# Patient Record
Sex: FEMALE | Race: BLACK OR AFRICAN AMERICAN | NOT HISPANIC OR LATINO | URBAN - METROPOLITAN AREA
[De-identification: names, ages, dates, MRNs, and addresses within clinical notes are randomized per-mention and may not be internally consistent; named-entity substitution may affect disease eponyms.]

---

## 2023-11-02 PROBLEM — Z00.00 ENCOUNTER FOR PREVENTIVE HEALTH EXAMINATION: Status: ACTIVE | Noted: 2023-11-02

## 2023-11-20 ENCOUNTER — EMERGENCY (EMERGENCY)
Facility: HOSPITAL | Age: 38
LOS: 1 days | Discharge: HOME | End: 2023-11-20
Attending: STUDENT IN AN ORGANIZED HEALTH CARE EDUCATION/TRAINING PROGRAM | Admitting: STUDENT IN AN ORGANIZED HEALTH CARE EDUCATION/TRAINING PROGRAM
Payer: COMMERCIAL

## 2023-11-20 ENCOUNTER — APPOINTMENT (OUTPATIENT)
Dept: HEPATOLOGY | Facility: CLINIC | Age: 38
End: 2023-11-20
Payer: MEDICAID

## 2023-11-20 VITALS
HEART RATE: 69 BPM | SYSTOLIC BLOOD PRESSURE: 110 MMHG | OXYGEN SATURATION: 100 % | TEMPERATURE: 98 F | RESPIRATION RATE: 18 BRPM | WEIGHT: 139.99 LBS | HEIGHT: 62 IN | DIASTOLIC BLOOD PRESSURE: 57 MMHG

## 2023-11-20 VITALS — DIASTOLIC BLOOD PRESSURE: 76 MMHG | HEART RATE: 71 BPM | SYSTOLIC BLOOD PRESSURE: 123 MMHG

## 2023-11-20 VITALS
WEIGHT: 140 LBS | HEART RATE: 86 BPM | TEMPERATURE: 98.9 F | DIASTOLIC BLOOD PRESSURE: 65 MMHG | HEIGHT: 62 IN | BODY MASS INDEX: 25.76 KG/M2 | OXYGEN SATURATION: 100 % | RESPIRATION RATE: 16 BRPM | SYSTOLIC BLOOD PRESSURE: 111 MMHG

## 2023-11-20 DIAGNOSIS — R55 SYNCOPE AND COLLAPSE: ICD-10-CM

## 2023-11-20 DIAGNOSIS — Z87.19 PERSONAL HISTORY OF OTHER DISEASES OF THE DIGESTIVE SYSTEM: ICD-10-CM

## 2023-11-20 DIAGNOSIS — D64.9 ANEMIA, UNSPECIFIED: ICD-10-CM

## 2023-11-20 DIAGNOSIS — R17 UNSPECIFIED JAUNDICE: ICD-10-CM

## 2023-11-20 LAB
ALBUMIN SERPL ELPH-MCNC: 2.9 G/DL — LOW (ref 3.3–5)
ALBUMIN SERPL ELPH-MCNC: 2.9 G/DL — LOW (ref 3.3–5)
ALP SERPL-CCNC: 154 U/L — HIGH (ref 40–120)
ALP SERPL-CCNC: 154 U/L — HIGH (ref 40–120)
ALT FLD-CCNC: 31 U/L — SIGNIFICANT CHANGE UP (ref 10–45)
ALT FLD-CCNC: 31 U/L — SIGNIFICANT CHANGE UP (ref 10–45)
ANION GAP SERPL CALC-SCNC: 8 MMOL/L — SIGNIFICANT CHANGE UP (ref 5–17)
ANION GAP SERPL CALC-SCNC: 8 MMOL/L — SIGNIFICANT CHANGE UP (ref 5–17)
ANISOCYTOSIS BLD QL: SLIGHT — SIGNIFICANT CHANGE UP
ANISOCYTOSIS BLD QL: SLIGHT — SIGNIFICANT CHANGE UP
AST SERPL-CCNC: 80 U/L — HIGH (ref 10–40)
AST SERPL-CCNC: 80 U/L — HIGH (ref 10–40)
BASOPHILS # BLD AUTO: 0 K/UL — SIGNIFICANT CHANGE UP (ref 0–0.2)
BASOPHILS # BLD AUTO: 0 K/UL — SIGNIFICANT CHANGE UP (ref 0–0.2)
BASOPHILS NFR BLD AUTO: 0 % — SIGNIFICANT CHANGE UP (ref 0–2)
BASOPHILS NFR BLD AUTO: 0 % — SIGNIFICANT CHANGE UP (ref 0–2)
BILIRUB SERPL-MCNC: 5.6 MG/DL — HIGH (ref 0.2–1.2)
BILIRUB SERPL-MCNC: 5.6 MG/DL — HIGH (ref 0.2–1.2)
BUN SERPL-MCNC: 9 MG/DL — SIGNIFICANT CHANGE UP (ref 7–23)
BUN SERPL-MCNC: 9 MG/DL — SIGNIFICANT CHANGE UP (ref 7–23)
CALCIUM SERPL-MCNC: 9.5 MG/DL — SIGNIFICANT CHANGE UP (ref 8.4–10.5)
CALCIUM SERPL-MCNC: 9.5 MG/DL — SIGNIFICANT CHANGE UP (ref 8.4–10.5)
CHLORIDE SERPL-SCNC: 103 MMOL/L — SIGNIFICANT CHANGE UP (ref 96–108)
CHLORIDE SERPL-SCNC: 103 MMOL/L — SIGNIFICANT CHANGE UP (ref 96–108)
CO2 SERPL-SCNC: 24 MMOL/L — SIGNIFICANT CHANGE UP (ref 22–31)
CO2 SERPL-SCNC: 24 MMOL/L — SIGNIFICANT CHANGE UP (ref 22–31)
CREAT SERPL-MCNC: 0.78 MG/DL — SIGNIFICANT CHANGE UP (ref 0.5–1.3)
CREAT SERPL-MCNC: 0.78 MG/DL — SIGNIFICANT CHANGE UP (ref 0.5–1.3)
EGFR: 101 ML/MIN/1.73M2 — SIGNIFICANT CHANGE UP
EGFR: 101 ML/MIN/1.73M2 — SIGNIFICANT CHANGE UP
EOSINOPHIL # BLD AUTO: 0.26 K/UL — SIGNIFICANT CHANGE UP (ref 0–0.5)
EOSINOPHIL # BLD AUTO: 0.26 K/UL — SIGNIFICANT CHANGE UP (ref 0–0.5)
EOSINOPHIL NFR BLD AUTO: 3.5 % — SIGNIFICANT CHANGE UP (ref 0–6)
EOSINOPHIL NFR BLD AUTO: 3.5 % — SIGNIFICANT CHANGE UP (ref 0–6)
GLUCOSE SERPL-MCNC: 106 MG/DL — HIGH (ref 70–99)
GLUCOSE SERPL-MCNC: 106 MG/DL — HIGH (ref 70–99)
HCG SERPL-ACNC: <0 MIU/ML — SIGNIFICANT CHANGE UP
HCG SERPL-ACNC: <0 MIU/ML — SIGNIFICANT CHANGE UP
HCT VFR BLD CALC: 29.7 % — LOW (ref 34.5–45)
HCT VFR BLD CALC: 29.7 % — LOW (ref 34.5–45)
HGB BLD-MCNC: 9.8 G/DL — LOW (ref 11.5–15.5)
HGB BLD-MCNC: 9.8 G/DL — LOW (ref 11.5–15.5)
HYPOCHROMIA BLD QL: SIGNIFICANT CHANGE UP
HYPOCHROMIA BLD QL: SIGNIFICANT CHANGE UP
LYMPHOCYTES # BLD AUTO: 0.77 K/UL — LOW (ref 1–3.3)
LYMPHOCYTES # BLD AUTO: 0.77 K/UL — LOW (ref 1–3.3)
LYMPHOCYTES # BLD AUTO: 10.4 % — LOW (ref 13–44)
LYMPHOCYTES # BLD AUTO: 10.4 % — LOW (ref 13–44)
MACROCYTES BLD QL: SLIGHT — SIGNIFICANT CHANGE UP
MACROCYTES BLD QL: SLIGHT — SIGNIFICANT CHANGE UP
MANUAL SMEAR VERIFICATION: SIGNIFICANT CHANGE UP
MANUAL SMEAR VERIFICATION: SIGNIFICANT CHANGE UP
MCHC RBC-ENTMCNC: 31.1 PG — SIGNIFICANT CHANGE UP (ref 27–34)
MCHC RBC-ENTMCNC: 31.1 PG — SIGNIFICANT CHANGE UP (ref 27–34)
MCHC RBC-ENTMCNC: 33 GM/DL — SIGNIFICANT CHANGE UP (ref 32–36)
MCHC RBC-ENTMCNC: 33 GM/DL — SIGNIFICANT CHANGE UP (ref 32–36)
MCV RBC AUTO: 94.3 FL — SIGNIFICANT CHANGE UP (ref 80–100)
MCV RBC AUTO: 94.3 FL — SIGNIFICANT CHANGE UP (ref 80–100)
MONOCYTES # BLD AUTO: 0.38 K/UL — SIGNIFICANT CHANGE UP (ref 0–0.9)
MONOCYTES # BLD AUTO: 0.38 K/UL — SIGNIFICANT CHANGE UP (ref 0–0.9)
MONOCYTES NFR BLD AUTO: 5.2 % — SIGNIFICANT CHANGE UP (ref 2–14)
MONOCYTES NFR BLD AUTO: 5.2 % — SIGNIFICANT CHANGE UP (ref 2–14)
MYELOCYTES NFR BLD: 1.8 % — HIGH (ref 0–0)
MYELOCYTES NFR BLD: 1.8 % — HIGH (ref 0–0)
NEUTROPHILS # BLD AUTO: 5.84 K/UL — SIGNIFICANT CHANGE UP (ref 1.8–7.4)
NEUTROPHILS # BLD AUTO: 5.84 K/UL — SIGNIFICANT CHANGE UP (ref 1.8–7.4)
NEUTROPHILS NFR BLD AUTO: 79.1 % — HIGH (ref 43–77)
NEUTROPHILS NFR BLD AUTO: 79.1 % — HIGH (ref 43–77)
OVALOCYTES BLD QL SMEAR: SLIGHT — SIGNIFICANT CHANGE UP
OVALOCYTES BLD QL SMEAR: SLIGHT — SIGNIFICANT CHANGE UP
PLAT MORPH BLD: NORMAL — SIGNIFICANT CHANGE UP
PLAT MORPH BLD: NORMAL — SIGNIFICANT CHANGE UP
PLATELET # BLD AUTO: 104 K/UL — LOW (ref 150–400)
PLATELET # BLD AUTO: 104 K/UL — LOW (ref 150–400)
POIKILOCYTOSIS BLD QL AUTO: SIGNIFICANT CHANGE UP
POIKILOCYTOSIS BLD QL AUTO: SIGNIFICANT CHANGE UP
POLYCHROMASIA BLD QL SMEAR: SLIGHT — SIGNIFICANT CHANGE UP
POLYCHROMASIA BLD QL SMEAR: SLIGHT — SIGNIFICANT CHANGE UP
POTASSIUM SERPL-MCNC: 4.1 MMOL/L — SIGNIFICANT CHANGE UP (ref 3.5–5.3)
POTASSIUM SERPL-MCNC: 4.1 MMOL/L — SIGNIFICANT CHANGE UP (ref 3.5–5.3)
POTASSIUM SERPL-SCNC: 4.1 MMOL/L — SIGNIFICANT CHANGE UP (ref 3.5–5.3)
POTASSIUM SERPL-SCNC: 4.1 MMOL/L — SIGNIFICANT CHANGE UP (ref 3.5–5.3)
PROT SERPL-MCNC: 8.3 G/DL — SIGNIFICANT CHANGE UP (ref 6–8.3)
PROT SERPL-MCNC: 8.3 G/DL — SIGNIFICANT CHANGE UP (ref 6–8.3)
RBC # BLD: 3.15 M/UL — LOW (ref 3.8–5.2)
RBC # BLD: 3.15 M/UL — LOW (ref 3.8–5.2)
RBC # FLD: 17.7 % — HIGH (ref 10.3–14.5)
RBC # FLD: 17.7 % — HIGH (ref 10.3–14.5)
RBC BLD AUTO: ABNORMAL
RBC BLD AUTO: ABNORMAL
SODIUM SERPL-SCNC: 135 MMOL/L — SIGNIFICANT CHANGE UP (ref 135–145)
SODIUM SERPL-SCNC: 135 MMOL/L — SIGNIFICANT CHANGE UP (ref 135–145)
TARGETS BLD QL SMEAR: SIGNIFICANT CHANGE UP
TARGETS BLD QL SMEAR: SIGNIFICANT CHANGE UP
WBC # BLD: 7.38 K/UL — SIGNIFICANT CHANGE UP (ref 3.8–10.5)
WBC # BLD: 7.38 K/UL — SIGNIFICANT CHANGE UP (ref 3.8–10.5)
WBC # FLD AUTO: 7.38 K/UL — SIGNIFICANT CHANGE UP (ref 3.8–10.5)
WBC # FLD AUTO: 7.38 K/UL — SIGNIFICANT CHANGE UP (ref 3.8–10.5)

## 2023-11-20 PROCEDURE — 84702 CHORIONIC GONADOTROPIN TEST: CPT

## 2023-11-20 PROCEDURE — 36415 COLL VENOUS BLD VENIPUNCTURE: CPT

## 2023-11-20 PROCEDURE — 99283 EMERGENCY DEPT VISIT LOW MDM: CPT

## 2023-11-20 PROCEDURE — 82962 GLUCOSE BLOOD TEST: CPT

## 2023-11-20 PROCEDURE — 99284 EMERGENCY DEPT VISIT MOD MDM: CPT

## 2023-11-20 PROCEDURE — 80053 COMPREHEN METABOLIC PANEL: CPT

## 2023-11-20 PROCEDURE — 99205 OFFICE O/P NEW HI 60 MIN: CPT

## 2023-11-20 PROCEDURE — 85025 COMPLETE CBC W/AUTO DIFF WBC: CPT

## 2023-11-20 RX ORDER — SODIUM CHLORIDE 9 MG/ML
1000 INJECTION INTRAMUSCULAR; INTRAVENOUS; SUBCUTANEOUS ONCE
Refills: 0 | Status: COMPLETED | OUTPATIENT
Start: 2023-11-20 | End: 2023-11-20

## 2023-11-20 RX ADMIN — SODIUM CHLORIDE 1000 MILLILITER(S): 9 INJECTION INTRAMUSCULAR; INTRAVENOUS; SUBCUTANEOUS at 14:13

## 2023-11-20 NOTE — ED PROVIDER NOTE - PHYSICAL EXAMINATION
CONSTITUTIONAL: Well-appearing;  in no apparent distress.   HEAD: Normocephalic; atraumatic.   EYES: PERRL; EOM intact; conjunctiva, +scleral icterus   ENT: normal nose; no rhinorrhea; normal pharynx with no erythema or lesions.   NECK: Supple; non-tender; no LAD  CARDIOVASCULAR: Normal S1, S2; No audible murmurs. Regular rate and rhythm.   RESPIRATORY: Breathing easily; breath sounds clear and equal bilaterally; no wheezes, rhonchi, or rales.  GI: Soft; distended; non-tender; no palpable organomegaly.   MSK: FROM at all extremities, normal tone   EXT: No cyanosis or edema; N/V intact  SKIN: Normal for age and race; warm; dry; good turgor; no apparent lesions or rash.   NEURO: A & O x 3; face symmetric; grossly unremarkable.   PSYCHOLOGICAL: The patient’s mood and manner are appropriate.

## 2023-11-20 NOTE — ED PROVIDER NOTE - ATTENDING APP SHARED VISIT CONTRIBUTION OF CARE
39 yo f with pmh of cirrhosis, etoh abuse (quit 3 week ago), jaundice c/o near syncopal episode while at the doctors office prior to arrival. will check labs, ekg, hcg, reassess.

## 2023-11-20 NOTE — ED PROVIDER NOTE - OBJECTIVE STATEMENT
37 yo f with pmh of cirrhosis, etoh abuse (quit 3 week ago), jaundice c/o near syncopal episode while at the doctors office prior to arrival. Pt was sitting felt lightheaded, her heart was racing and she was sweaty. Pt felt like she was going to pass out.  [place] worse when she tried to stand up. Pt states she feels well now. Denies ha, dizziness, n/v, cp, sob, abd pain. Pt seen at outside hospital in oct for ascites and had a therapeutic paracentesis. Pt states her abd has been getting smaller. Pt had blood work done with her hepatologist last week but she had not received the results yet.

## 2023-11-20 NOTE — ED PROVIDER NOTE - PATIENT PORTAL LINK FT
You can access the FollowMyHealth Patient Portal offered by [facility] by registering at the following website: [email or web address]. By joining [facility]’s FollowMyHealth portal, you will also be able to view your health information using other applications (apps) compatible with our system.

## 2023-11-20 NOTE — ED ADULT NURSE NOTE - CHPI ED NUR SYMPTOMS NEG
My chart message sent to patient. no back pain/no chest pain/no chills/no congestion/no dizziness/no nausea/no shortness of breath/no vomiting

## 2023-11-20 NOTE — ED PROVIDER NOTE - EKG #1 DATE/TIME
F/u here as needed  Begin using warm compresses and washing with baby no tears shampoo  F/u with PCP if no improvement 
20-Nov-2023 13:36

## 2023-11-20 NOTE — ED PROVIDER NOTE - CLINICAL SUMMARY MEDICAL DECISION MAKING FREE TEXT BOX
39 yo f with pmh of cirrhosis, etoh abuse (quit 3 week ago), jaundice c/o near syncopal episode while at the doctors office prior to arrival. Pt was sitting felt lightheaded, her heart was racing and she was sweaty. Pt felt like she was going to pass out. Pt states she feels well now. Denies ha, dizziness, n/v, cp, sob, abd pain. VSS. EKG NSR. +scleral icterus. neuro intact. cardio rrr, nml s1s2 37 yo f with pmh of cirrhosis, etoh abuse (quit 3 week ago), jaundice c/o near syncopal episode while at the doctors office prior to arrival. Pt was sitting felt lightheaded, her heart was racing and she was sweaty. Pt felt like she was going to pass out. Pt states she feels well now. Denies ha, dizziness, n/v, cp, sob, abd pain. VSS. EKG NSR. +scleral icterus. neuro intact. cardio rrr, nml s1s2. Labs sig for anemia, elevated bili (chronic, pt needs transplant). Orthostatics normal. Pt feeling well. Likely vasovagal, doubt cardiac causes

## 2023-11-20 NOTE — ED ADULT NURSE NOTE - OBJECTIVE STATEMENT
39 y/o F BIBA from doctor's office s/p near syncope. pt stated " she has Cirrhosis and was doing a follow up" . c/o headache.

## 2023-12-11 RX ORDER — POLYETHYLENE GLYCOL 3350 17 G/17G
17 POWDER, FOR SOLUTION ORAL
Qty: 238 | Refills: 0 | Status: ACTIVE | COMMUNITY
Start: 2023-12-11 | End: 1900-01-01

## 2023-12-13 ENCOUNTER — APPOINTMENT (OUTPATIENT)
Dept: HEPATOLOGY | Facility: HOSPITAL | Age: 38
End: 2023-12-13
Payer: MEDICAID

## 2023-12-13 ENCOUNTER — OUTPATIENT (OUTPATIENT)
Dept: OUTPATIENT SERVICES | Facility: HOSPITAL | Age: 38
LOS: 1 days | Discharge: HOME | End: 2023-12-13
Payer: MEDICAID

## 2023-12-13 ENCOUNTER — RESULT REVIEW (OUTPATIENT)
Age: 38
End: 2023-12-13

## 2023-12-13 VITALS
HEIGHT: 62 IN | SYSTOLIC BLOOD PRESSURE: 124 MMHG | HEART RATE: 74 BPM | DIASTOLIC BLOOD PRESSURE: 72 MMHG | WEIGHT: 139.99 LBS | TEMPERATURE: 98 F | RESPIRATION RATE: 18 BRPM | OXYGEN SATURATION: 100 %

## 2023-12-13 PROCEDURE — 88305 TISSUE EXAM BY PATHOLOGIST: CPT

## 2023-12-13 PROCEDURE — XXXXX: CPT | Mod: 1L

## 2023-12-13 PROCEDURE — 43239 EGD BIOPSY SINGLE/MULTIPLE: CPT

## 2023-12-13 PROCEDURE — 88305 TISSUE EXAM BY PATHOLOGIST: CPT | Mod: 26

## 2023-12-13 PROCEDURE — 45380 COLONOSCOPY AND BIOPSY: CPT

## 2023-12-13 NOTE — PRE-ANESTHESIA EVALUATION ADULT - NSANTHPMHFT_GEN_ALL_CORE
Cardiac: Denies HTN, HLD, MI/Angina/Heart, Arrhythmia, Murmur/Valvular Disorder. >4 METS  Pulmonary: Positive for Asthma. Denies COPD, MIRIAN  Renal: Denies kidney dysfunction  Hepatic: Positive for alcoholic cirrhosis c/b ascites and jaundice  Gastrointestinal: Positive for Crohn's disease. Denies GERD.  Endocrine: Denies DM or thyroid dysfunction  Neurologic: Denies stroke/seizure disorder  Psychiatric: Positive for schizophrenia, ETOH abuse disorder  Hematologic: Positive for anemia. Denies blood clotting disorder, blood thinning medication.    PSH: Non-contributory, colonoscopy previously

## 2023-12-13 NOTE — PRE-ANESTHESIA EVALUATION ADULT - NSDENTALSD_ENT_ALL_CORE
Missing several rear molars, poor dentition with several chipped teeth, ground/worn teeth, cavities./missing teeth

## 2023-12-15 LAB
SURGICAL PATHOLOGY STUDY: SIGNIFICANT CHANGE UP
SURGICAL PATHOLOGY STUDY: SIGNIFICANT CHANGE UP

## 2023-12-20 ENCOUNTER — APPOINTMENT (OUTPATIENT)
Dept: HEPATOLOGY | Facility: CLINIC | Age: 38
End: 2023-12-20
Payer: MEDICAID

## 2023-12-20 ENCOUNTER — APPOINTMENT (OUTPATIENT)
Dept: TRANSPLANT | Facility: CLINIC | Age: 38
End: 2023-12-20

## 2023-12-20 VITALS
WEIGHT: 150 LBS | TEMPERATURE: 99.1 F | DIASTOLIC BLOOD PRESSURE: 74 MMHG | SYSTOLIC BLOOD PRESSURE: 131 MMHG | HEART RATE: 102 BPM | HEIGHT: 62 IN | OXYGEN SATURATION: 100 % | BODY MASS INDEX: 27.6 KG/M2 | RESPIRATION RATE: 16 BRPM

## 2023-12-20 DIAGNOSIS — Z01.810 ENCOUNTER FOR PREPROCEDURAL CARDIOVASCULAR EXAMINATION: ICD-10-CM

## 2023-12-20 DIAGNOSIS — Z87.19 PERSONAL HISTORY OF OTHER DISEASES OF THE DIGESTIVE SYSTEM: ICD-10-CM

## 2023-12-20 PROCEDURE — 99214 OFFICE O/P EST MOD 30 MIN: CPT

## 2023-12-20 RX ORDER — ALBUTEROL SULFATE 90 UG/1
108 (90 BASE) INHALANT RESPIRATORY (INHALATION)
Refills: 0 | Status: ACTIVE | COMMUNITY
Start: 2023-12-20

## 2023-12-20 RX ORDER — TRAZODONE HYDROCHLORIDE 50 MG/1
50 TABLET ORAL
Refills: 0 | Status: ACTIVE | COMMUNITY
Start: 2023-12-20

## 2023-12-20 RX ORDER — ARIPIPRAZOLE 15 MG/1
15 TABLET ORAL DAILY
Refills: 0 | Status: ACTIVE | COMMUNITY
Start: 2023-12-20

## 2023-12-20 RX ORDER — FUROSEMIDE 20 MG/1
20 TABLET ORAL DAILY
Refills: 0 | Status: ACTIVE | COMMUNITY
Start: 2023-12-20

## 2023-12-20 RX ORDER — BUDESONIDE AND FORMOTEROL FUMARATE DIHYDRATE 160; 4.5 UG/1; UG/1
160-4.5 AEROSOL RESPIRATORY (INHALATION)
Refills: 0 | Status: ACTIVE | COMMUNITY
Start: 2023-12-20

## 2023-12-20 RX ORDER — SPIRONOLACTONE 50 MG/1
50 TABLET ORAL DAILY
Refills: 0 | Status: ACTIVE | COMMUNITY
Start: 2023-12-20

## 2023-12-20 RX ORDER — LACTULOSE 10 G/15ML
10 SOLUTION ORAL DAILY
Refills: 0 | Status: ACTIVE | COMMUNITY
Start: 2023-12-20

## 2023-12-20 RX ORDER — MIRTAZAPINE 15 MG/1
15 TABLET, FILM COATED ORAL
Refills: 0 | Status: ACTIVE | COMMUNITY
Start: 2023-12-20

## 2023-12-20 RX ORDER — RIFAXIMIN 550 MG/1
550 TABLET ORAL TWICE DAILY
Refills: 0 | Status: ACTIVE | COMMUNITY
Start: 2023-12-20

## 2023-12-20 RX ORDER — CIPROFLOXACIN HYDROCHLORIDE 500 MG/1
500 TABLET, FILM COATED ORAL DAILY
Refills: 0 | Status: ACTIVE | COMMUNITY
Start: 2023-12-20

## 2023-12-21 PROBLEM — Z01.810 PRE-OPERATIVE CARDIOVASCULAR EXAMINATION: Status: ACTIVE | Noted: 2023-12-20

## 2023-12-26 ENCOUNTER — NON-APPOINTMENT (OUTPATIENT)
Age: 38
End: 2023-12-26

## 2023-12-26 LAB
ABO + RH PNL BLD: NORMAL
AFP-TM SERPL-MCNC: 4.2 NG/ML
ALBUMIN SERPL ELPH-MCNC: 3.3 G/DL
ALP BLD-CCNC: 154 U/L
ALT SERPL-CCNC: 37 U/L
AMMONIA PLAS-MCNC: 46.2 UMOL/L
ANION GAP SERPL CALC-SCNC: 10 MMOL/L
APPEARANCE: ABNORMAL
AST SERPL-CCNC: 84 U/L
BACTERIA: NEGATIVE /HPF
BILIRUB SERPL-MCNC: 3.3 MG/DL
BILIRUBIN URINE: ABNORMAL
BLOOD URINE: ABNORMAL
BUN SERPL-MCNC: 9 MG/DL
CALCIUM SERPL-MCNC: 9.1 MG/DL
CAST: 4 /LPF
CHLORIDE SERPL-SCNC: 102 MMOL/L
CHOLEST SERPL-MCNC: 199 MG/DL
CMV IGG SERPL QL: >10 U/ML
CMV IGG SERPL-IMP: POSITIVE
CO2 SERPL-SCNC: 23 MMOL/L
COLOR: NORMAL
COVID-19 SPIKE DOMAIN ANTIBODY INTERPRETATION: POSITIVE
CREAT SERPL-MCNC: 0.78 MG/DL
CREAT SPEC-SCNC: 273 MG/DL
CREAT/PROT UR: 0.1 RATIO
EBV DNA SERPL NAA+PROBE-ACNC: NOT DETECTED IU/ML
EBV EA AB SER IA-ACNC: 29.5 U/ML
EBV EA AB TITR SER IF: POSITIVE
EBV EA IGG SER QL IA: >600 U/ML
EBV EA IGG SER-ACNC: POSITIVE
EBV EA IGM SER IA-ACNC: NEGATIVE
EBV PATRN SPEC IB-IMP: NORMAL
EBV VCA IGG SER IA-ACNC: >750 U/ML
EBV VCA IGM SER QL IA: <10 U/ML
EBVPCR LOG: NOT DETECTED LOG10IU/ML
EGFR: 101 ML/MIN/1.73M2
EPITHELIAL CELLS: 13 /HPF
EPSTEIN-BARR VIRUS CAPSID ANTIGEN IGG: POSITIVE
ESTIMATED AVERAGE GLUCOSE: 85 MG/DL
ETHANOL BLD-MCNC: <10 MG/DL
GLUCOSE QUALITATIVE U: NEGATIVE MG/DL
GLUCOSE SERPL-MCNC: 134 MG/DL
HAV IGM SER QL: NONREACTIVE
HBA1C MFR BLD HPLC: 4.6 %
HBV CORE IGG+IGM SER QL: NONREACTIVE
HBV SURFACE AB SER QL: REACTIVE
HBV SURFACE AB SERPL IA-ACNC: 33.1 MIU/ML
HBV SURFACE AG SER QL: NONREACTIVE
HCG SERPL-MCNC: <1 MIU/ML
HCV AB SER QL: NONREACTIVE
HCV S/CO RATIO: 0.22 S/CO
HDLC SERPL-MCNC: 74 MG/DL
HEPATITIS A IGG ANTIBODY: NONREACTIVE
HEPATITIS A IGG ANTIBODY: NONREACTIVE
HIV1+2 AB SPEC QL IA.RAPID: NONREACTIVE
HSV 1+2 IGG SER IA-IMP: POSITIVE
HSV 1+2 IGG SER IA-IMP: POSITIVE
HSV1 IGG SER QL: 37.6 INDEX
HSV2 IGG SER QL: 2.29 INDEX
INR PPP: 1.51 RATIO
ISOAGGLUTININ TITER, ANTI-A, IGG: 256
ISOAGGLUTININ TITER, ANTI-A, IGM: 512
KETONES URINE: ABNORMAL MG/DL
LDLC SERPL CALC-MCNC: 107 MG/DL
LEUKOCYTE ESTERASE URINE: ABNORMAL
M TB IFN-G BLD-IMP: ABNORMAL
MAGNESIUM SERPL-MCNC: 1.6 MG/DL
MEV IGG FLD QL IA: 239 AU/ML
MEV IGG+IGM SER-IMP: POSITIVE
MICROSCOPIC-UA: NORMAL
MUV AB SER-ACNC: POSITIVE
MUV IGG SER QL IA: 17.4 AU/ML
NITRITE URINE: NEGATIVE
NONHDLC SERPL-MCNC: 125 MG/DL
PETH 16:0/18:1: 195 NG/ML
PETH 16:0/18:2: >400 NG/ML
PETH COMMENTS: NORMAL
PH URINE: 6
PHOSPHATE SERPL-MCNC: 3.4 MG/DL
POTASSIUM SERPL-SCNC: 3.6 MMOL/L
PROT SERPL-MCNC: 7.6 G/DL
PROT UR-MCNC: 23 MG/DL
PROTEIN URINE: NORMAL MG/DL
PT BLD: 16.9 SEC
QUANTIFERON TB PLUS MITOGEN MINUS NIL: 0.08 IU/ML
QUANTIFERON TB PLUS NIL: 0.03 IU/ML
QUANTIFERON TB PLUS TB1 MINUS NIL: 0.01 IU/ML
QUANTIFERON TB PLUS TB2 MINUS NIL: 0.01 IU/ML
RED BLOOD CELLS URINE: 8 /HPF
RUBV IGG FLD-ACNC: 9.1 INDEX
RUBV IGG FLD-ACNC: 9.1 INDEX
RUBV IGG SER-IMP: POSITIVE
RUBV IGG SER-IMP: POSITIVE
SARS-COV-2 AB SERPL IA-ACNC: >250 U/ML
SARS-COV-2 N GENE NPH QL NAA+PROBE: NOT DETECTED
SODIUM SERPL-SCNC: 135 MMOL/L
SPECIFIC GRAVITY URINE: 1.02
T GONDII AB SER-IMP: NEGATIVE
T GONDII IGG SER QL: <3 IU/ML
T PALLIDUM AB SER QL IA: NEGATIVE
TRIGL SERPL-MCNC: 99 MG/DL
UROBILINOGEN URINE: 1 MG/DL
VZV AB TITR SER: POSITIVE
VZV IGG SER IF-ACNC: 1220 INDEX
WHITE BLOOD CELLS URINE: 28 /HPF

## 2023-12-27 LAB — STRONGYLOIDES AB SER IA-ACNC: NEGATIVE

## 2023-12-28 LAB — DRUG ABUSE PANEL-9, SERUM: ABNORMAL

## 2024-01-03 ENCOUNTER — APPOINTMENT (OUTPATIENT)
Dept: TRANSPLANT | Facility: CLINIC | Age: 39
End: 2024-01-03
Payer: MEDICAID

## 2024-01-03 VITALS
SYSTOLIC BLOOD PRESSURE: 126 MMHG | TEMPERATURE: 97.9 F | RESPIRATION RATE: 17 BRPM | BODY MASS INDEX: 27.97 KG/M2 | OXYGEN SATURATION: 100 % | DIASTOLIC BLOOD PRESSURE: 75 MMHG | HEART RATE: 105 BPM | WEIGHT: 152 LBS | HEIGHT: 62 IN

## 2024-01-03 PROCEDURE — 99205 OFFICE O/P NEW HI 60 MIN: CPT

## 2024-01-03 NOTE — ASSESSMENT
[Good candidate] : a good candidate. We should proceed with our protocol for evaluation for liver transplantation. [FreeTextEntry1] : recent Etoh use, but enrolled in RPP and doing well MELD decreasing from 27 to 17

## 2024-01-03 NOTE — END OF VISIT
[Time Spent: ___ minutes] : I have spent [unfilled] minutes of time on the encounter. [>50% of the face to face encounter time was spent on counseling and/or coordination of care for ___] : Greater than 50% of the face to face encounter time was spent on counseling and/or coordination of care for [unfilled] [Attestation] : The patient was explained alternatives, benefits and risk of liver transplantation, including but not limited to infection, bleeding, hepatic artery or portal vein thrombosis, primary dysfunction or primary non-function of the liver allograft, cardiopulmonary arrest, intra-operative death and other surgical, medical and psychosocial risks as outlined in the evaluation consent form.  The patient understands these risks and is willing to proceed with liver transplantation. The patient was also explained the need to remain on lifelong anti-rejection medications.  We discussed the risks and side effects of immunosuppressive medications including, but not limited to infection, cancer, weight gain, new onset or worsening of diabetes or hypertension in a temporary or permanent state, kidney dysfunction, water retention, back pain, constipation, diarrhea, dizziness, headache, joint pain, loss of appetite, nausea, stomach pain or upset, trouble sleeping, vomiting, and mental or mood changes.  An overview of the follow-up protocol was reviewed including outpatient visits, blood tests and the potential for hospital readmission.  The patient understands these risks and is willing to proceed with liver transplantation. We also discussed the available donor organ pool. We discussed the assessment of the  donor including age, cause of death, cardiac arrest, electrolyte abnormalities, course and length of hospital stay, use of vasopressors, hepatitis and HIV testing. We reviewed organ donor risk factors that could affect the success of the graft or the health of the patient, including, but not limited to, the donor's history, condition or age of the organs used, or the patient's potential risk of jeremy the human immunodeficiency virus and other infectious diseases if the disease cannot be detected in an infected donor.  We discussed and defined the option of an extended criteria for cadaveric donors (Hepatitis B core Ab positive donor, Hepatitis C Ab positive donor, steatosis, older donors, split livers and DCD donors) and early and late outcomes of graft survival after transplantation. The options of  donor liver transplantation vs. live donor liver transplantation were discussed with the patient.  Differences between donation after cardiac death (DCD) compared to donation after brain death (DBD) liver transplantation were also fully disclosed and included lower graft survival rates, the increased incidence of hepatic artery stenosis, bile duct injury, ischemic cholangiopathy and increased re transplant rates seen in recipients of DCD donor livers. The use of the U.S. Public Health Services (PHS) Guideline has defined some donors as "Increased Risk Donors" based on their history which may suggest socially increased risk behaviors was discussed. The patient is aware that if PHS increased risk donor is offered to the candidate, the transplant team will discuss the specifics to assist with making an informed decision. We discussed our post-transplant protocol of serology testing if the candidate receives an organ that is PHS increased risk. The patient was made aware that it is against the law to be paid or to pay to donate an organ.  If any money was given or will be given in exchange for receiving an organ, the patient may be subject to criminal prosecution, and any insurance coverage may no longer apply and patient may become personally responsible for all the health care costs associated with the donation, and private health information will be available to law enforcement agencies. We explained that we store vessels for subsequent later use in transplants.  Again, we discussed the extensive testing done on  donors prior to donation, however despite an extensive evaluation on the donor, there is potential risk a recipient may contract infectious diseases (HIV or Hepatitis) or cancer if they cannot be detected in the donor. In the cases where PHS Increased Risk donor vessels are used, we test the recipient per protocol post-transplant for any potential infectious disease transmission. The patient understands that there is the potential of use of  donor vessels and PHS increased risk donor vessels. The patient understands these risks and is willing to receive potentially PHS increased risk donor vessels. We also discussed the MELD allocation system in depth and the one-year observed and expected patient and graft survival rates according to latest data from the Scientific Registry for Transplant Recipients. These center specific outcomes were provided in comparison to the national one-year averages as described in the evaluation consent forms. Prior to signing consent, the patient was given an opportunity to ask questions.  After all concerns were addressed, informed consent was signed. Patient is aware that they may withdraw their consent for transplantation at any time.  Further, the patient is aware of the right to refuse an organ offer without penalty at any time.

## 2024-01-03 NOTE — REASON FOR VISIT
[Initial] : an initial visit for [Liver Transplant Evaluation] : liver transplant evaluation [Parent] : parent [FreeTextEntry2] : Dr Timmy Gifford

## 2024-01-03 NOTE — HISTORY OF PRESENT ILLNESS
[Alcoholic Liver Disease] : Alcoholic Liver Disease [Ascites] : Ascites [History of HCC] : No history of hepatocellular carcinoma [Previous Transplant] : No history of previous transplant [Diabetes] : No history of diabetes [Hypertension] : No history of hypertension [Coronary Artery Disease] : No history of coronary artery disease [Previous MI] : No history of previous MI [Dialysis] : No history of dialysis [Not Working] : Not working [90: Able to carry normal activity; minor signs or symptoms of disease.] : 90: Able to carry normal activity; minor signs or symptoms of disease.  [FreeTextEntry1] : 17 [FreeTextEntry2] : O [TextBox_42] : 38F initially seen at [facility] for Alcohol related cirrhosis, declined for listing for liver transplant due to concern about missed appointments. patient reports drinking wine and vodka almost daily for 2 years, and was admitted in 2023 with acute liver failure and ascites. s/p LVP x2 MELD at the time was 27, and has been slowly improving (was 17 last week). Since discharge she has been in a RPP and attends weekly via Zoom.  She denies any fevers, abdominal pain, distention, nausea, hematemesis, or melena. +jaundice, which has slightly improved.  Labs 23 INR 1.5 (from 2.9) Na 135 Cr 0.8 TBili 3.3 AST 84 ALT 37 AlkPhos 154  Patient was diagnosed and treated for Crohn's disease 12 years ago, but stopped meds due to cost. Recent colonoscopy and EGD  did not show evidence of disease. Imaging at [facility] showed cirrhosis and ascites without HCC.  PMH: Etoh cirrhosis, ? Crohn's PSH:  Meds: Lasix, Aldactone, Cipro, Rifaximin, Lactulose, Folic acid, Thiamine, Mirtazapine, Aricept, Trazodone, Albuterol Allergies: NKDA Social: Quit Etoh 10/2023, enrolled in RPP Denies tob, drugs Lives with mother has 2 children (13-14 who live with their father) FMH: mother had OLT  at [facility] (etoh/drugs) Maternal and Paternal grandparents , were alcoholics No malignancy   ROS: Cardiac - no MI, CHF, CAD Pulmonary- no h/o COPD, Asthma, or pneumonia Infections- no h/o TB, HIV, Hepatitis. +vaccinated for covid.  Heme- no h/o malignancy or bleeding disorders. No DVT/PE Endo- No Diabetes or Thyroid disease Neuro- no h/o CVA or seizures.  Sensitization events- +previous blood transfusion, No previous transplant  - No UTI or Kidney stones. Makes normal amount of urine GI - had colonoscopy/EGD 2 weeks ago. no melena or hematemesis

## 2024-01-03 NOTE — PLAN
[Patient agrees to proceed with the full evaluation for liver transplantation.] : Patient agrees to proceed with the full evaluation for liver transplantation. [FreeTextEntry1] : Meld decreasing.  f/u  re: [email or web address] get copy of MRI from [facility] to review

## 2024-01-03 NOTE — REVIEW OF SYSTEMS
[Fever] : no fever [Sclera anicteric] : sclera icteric [Wears glasses] : does not wear glasses [Chest Pain] : no chest pain [SOB] : no shortness of breath [Abdominal Pain] : no abdominal pain [Nausea] : no nausea [Dysuria] : no dysuria

## 2024-01-03 NOTE — PHYSICAL EXAM
[Healthy Appearing] : healthy appearing [No Acute Distress] : no acute distress [Scleral Icterus] : scleral icterus [No Lymphadenopathy] : no lymphadenopathy [Breathing Comfortably on room air] : breathing comfortably on room air [Normal Rate] : normal rate [Ascites Fluid Wave] : no ascites fluid wave [Abdominal Ascites] : abdominal ascites [Ascites Tense] : no ascites tense [Caput Medusae] : no caput medusae [Umbilical Hernia] : no umbilical hernia [Soft] : soft [No Edema] : no edema [No Skin Discoloration] : no skin discoloration [No Ulcers] : no ulcers [] : right dorsalis pedis palpable [Spider Angioma] : no spider angioma [Jaundice] : jaundice [No Rash] : no rash [Asterixis] : no asterixis [Hepatic Encephalopathy] : no hepatic encephalopathy

## 2024-02-07 ENCOUNTER — APPOINTMENT (OUTPATIENT)
Dept: HEPATOLOGY | Facility: CLINIC | Age: 39
End: 2024-02-07

## 2024-02-07 ENCOUNTER — OUTPATIENT (OUTPATIENT)
Dept: OUTPATIENT SERVICES | Facility: HOSPITAL | Age: 39
LOS: 1 days | End: 2024-02-07
Payer: MEDICAID

## 2024-02-07 ENCOUNTER — APPOINTMENT (OUTPATIENT)
Dept: PSYCHIATRY | Facility: HOSPITAL | Age: 39
End: 2024-02-07

## 2024-02-07 DIAGNOSIS — F20.9 SCHIZOPHRENIA, UNSPECIFIED: ICD-10-CM

## 2024-02-07 DIAGNOSIS — Z01.818 ENCOUNTER FOR OTHER PREPROCEDURAL EXAMINATION: ICD-10-CM

## 2024-02-07 DIAGNOSIS — F41.9 ANXIETY DISORDER, UNSPECIFIED: ICD-10-CM

## 2024-02-07 DIAGNOSIS — F10.20 ALCOHOL DEPENDENCE, UNCOMPLICATED: ICD-10-CM

## 2024-02-07 PROCEDURE — 90791 PSYCH DIAGNOSTIC EVALUATION: CPT

## 2024-02-09 ENCOUNTER — APPOINTMENT (OUTPATIENT)
Dept: INFECTIOUS DISEASE | Facility: CLINIC | Age: 39
End: 2024-02-09

## 2024-02-16 NOTE — ASSESSMENT
[FreeTextEntry1] : 39 yo F with ETOH cirr and ascites s/p LVP x 2.  Cirr recently discovered in Oct when pt p/w abdl distentions. Sober 3 weeks. MELD 27  [ABO ?]  # CIrr and Port htn MRI 10/2023 cw cirr, no HCC (obtain MRI report and CD) -ascites, continue diurectics SBP ppx (per Mother, neg SBB) -bleeding gums 3 weeks ago, never hemoptysis scd EGD for EV surveillance HE- continue xif/lactulose, titrate to 3bms. invite to Liver txp evalution Dec 6 labs today  # AUD continue abstinence, serial PEths RPP - continue virtual daily, once/week in person

## 2024-02-16 NOTE — END OF VISIT
[FreeTextEntry3] : Attending note  I have seen and examined patient at bedside. I agree with hx, ROS, physical examination, imp/suggestions as written by Ms. Sasha Partida NP. Please see my note.  Patient is here for second opinion regarding her liver disease Problem list Alcohol associated liver disease with history of alcoholic hepatitis Last drink approximately 3 weeks ago History of Crohn disease with negative colonoscopy approximately 3 years ago Family history of alcohol use disorder IUD History of mental health issues with depression/schizophrenia  While in the hospital, she underwent paracentesis and was discharged diuretics.  She has felt well at the beginning of the visit but later on, developed sweating and dizziness and had to call 911 to take her to the hospital.  At the time of EMS arrival she was back to her regular state of health with no symptoms. She is accompanied by her mother Physical exam is remarkable for mild ascites and mild edema of the legs and is clear icterus No encephalopathy I do long conversation with patient and emphasized on the importance of alcohol avoidance, risks associated with drinking including liver failure and death, need for pursuing an alcohol rehab program and importance of compliance. She will be invited for every visit for transplant evaluation. EGD and colonoscopy will be arranged an indication for the procedures and potential risks were discussed with patient in details

## 2024-02-16 NOTE — HISTORY OF PRESENT ILLNESS
[de-identified] : 39 yo F with asthma, depression, schizophrenia and ? chron's diagnosis per pt 12 yrs ago but never followed up, menorrhagia s/p IUD and alcohol use disorder with ETOH cirr c/b ascited, jaundice,    Cirrhosis recently diagnosed at hospitalization at [facility] (10/17/23) when pt p/w increased abdl distention, was not able to get LVP, got infected with COVID, s/p treatment with IV; then discharged 2 weeks later.  Re-presented one week later with recurrent abdl distention, found to have ascites, s/p LVP. 2 yrs prior (2021) pt had LVP at [facility], was told she had fatty liver.  MELD 27 [ABO ?  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, 2023 single, lives with mother 2 children, 13/14 Covid vax - ([employer]) x 3 covid infection Oct, 2023  FHX Mother, reformed alcholic and drugs, s/p OLTx 2021 ([facility]) both Maternal/Paternal Grandparent Alcoholic dneis any cancers  SURG C Sec x 1 2009  STUDIES MRI in Oct at [facility] - per pt discovered cirr, no liver cancer  EGD - never COLON - 12 yrs ago  INTERVAL HX feels 'ok' denies abdl pain, NVD, CP, SOB, f/c appetite 'good' BM 5-6 x/day denies confusion has trouble both falling and staying asleep, helped by mritazapine Used to have gum bleeding 3 weeks ago, denies ever vomitting blood IUD for menorrhagia

## 2024-02-17 NOTE — HISTORY OF PRESENT ILLNESS
[90: Able to carry normal activity; minor signs or symptoms of disease.] : 90: Able to carry normal activity; minor signs or symptoms of disease.  [TextBox_42] : 39 yo F with asthma, depression, schizophrenia and ? Crohn's diagnosis (per pt 12 yrs ago but never followed up), menorrhagia s/p IUD and alcohol use disorder with ETOH cirr c/b ascites s/p LVP x 2, jaundice.  No HCC.  Sober since Oct 18, 2022  LVP x 2  Oct, 2023, , on Cipro ppx  Cirrhosis recently dx'd during hospitalization at [facility] (10/17/23) when pt p/w increased abdl distention, was not able to get LVP, got infected with COVID, s/p treatment with IV; then discharged 2 weeks later.  Re-presented one week later with recurrent abdl distention, found to have ascites, s/p LVP.  2 yrs prior () pt had LVP at [facility], was told she had fatty liver.  MELD 27 (from Oct)   [ABO ? ]   BM! 25.6  accompanied by mother and GM  MEDICATIONS lasix 20mg daily Aldactone 50 mg daily cipro 500mg daily rifaximin 550mg bid lactulose folic acid thiamine 100mg mirtazapine 15mg nightly Aricept 15mg nightly trazodone 50mg nightly albuterol prn  LABS 23 (10/27)  (136) K 4.1 SCr 0.78 ALB 2.9 TB 5.6 (5.8)  (179) AST 80 (59) ALT 31 (30) HG 9.8 (8.9)  (78) INR 2.16 from 10/27  SOCIAL Tobacco - never ETOH - used to drink bottle of wine, 2 shots of vodka daily x 3-4 yrs, sober 2022 joined [facility] last year, still in the [employer] maintenance, stopped working  single, lives with mother 2 children,  (lives with their father) Covid vax - (Pfizer) x 3 covid infection Oct, 2023 - asymptomatic  FHX Mother, reformed alcoholic and drugs, s/p OLTx  ([facility]) both Maternal/Paternal Grandparent Alcoholic,  denies any cancers  SURG C Sec x 1   STUDIES MRI in Oct at [facility] - per pt discovered cirr, no liver cancer  EGD 23   Small column of esophageal varix without red sign.  Mild portal hypertensive gastropathy. -Mild GAVE with no active oozing. . Erythema in the second part of the duodenum compatible with duodenitis. (Biopsy).  COLON  23   BBPS 6 Normal mucosa in the terminal ileum. Colonic mucosa appeared normal except for minimal edema and congestion of descending colon. Biopsies of TI, right and left colon were done.  Mild diverticulosis of sigmoid colon with small diverticulum. (Biopsy). Medium rectal hemorrhoids.   PATH Final Diagnosis 1.  Duodenum, biopsy: -   Duodenal mucosa with preserved villous architecture. -   Negative for increased intraepithelial lymphocytes and microorganisms. 2.  Terminal ileum, biopsy: -   Scant superficial fragments of small intestinal mucosa with no specific histologic abnormality. 3.  Right colon, biopsy: -   Colonic mucosa with no specific histologic abnormality. 4.  Left colon, biopsy: -   Colonic mucosa with no specific histologic abnormality.  ALLERGIES - NKA  INTERVAL HX feel 'ok' denies abdl pain, NVD, CP, SOB, f/c appetite increased BM 6x day, no rectal blood, has BM after eating denies confusion endorses trouble getting to sleep, does sleep through nightg, sometimes forgetful (can forget what someone said a day or two ago)  [de-identified] : Reason For Visit Pre-OLT work up CURT COUCH is a 38 year old female who presents for an initial visit for liver transplant evaluation.  History of Present Illness Karnofsky Performance Scale Index: 90: Able to carry normal activity; minor signs or symptoms of disease.   37 yo F with asthma, depression, schizophrenia and ? Crohn's diagnosis (per pt 12 yrs ago but never followed up), menorrhagia s/p IUD and alcohol use disorder with ETOH cirr c/b ascites s/p LVP x 2, jaundice. No HCC.  Sober since Oct 18, 2022  LVP x 2 Oct, 2023, , on Cipro ppx  Cirrhosis recently dx'd during hospitalization at [facility] (10/17/23) when pt p/w increased abdl distention, was not able to get LVP, got infected with COVID, s/p treatment with IV; then discharged 2 weeks later. Re-presented one week later with recurrent abdl distention, found to have ascites, s/p LVP. 2 yrs prior () pt had LVP at [facility], was told she had fatty liver.  MELD 27 (from Oct) [ABO ? ] BM! 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 Mild portal hypertensive gastropathy. -Mild GAVE with no active oozing.. Erythema in the second part of the duodenum compatible with duodenitis. (Biopsy).  COLON 23 BBPS 6 Normal mucosa in the terminal ileum. Colonic mucosa appeared normal except for minimal edema and congestion of descending colon. Biopsies of TI, right and left colon were done. Mild diverticulosis of sigmoid colon with small diverticulum. (Biopsy). Medium rectal hemorrhoids.  PATH Final Diagnosis 1. Duodenum, biopsy: - Duodenal mucosa with preserved villous architecture. - Negative for increased intraepithelial lymphocytes and microorganisms. 2. Terminal ileum, biopsy: - Scant superficial fragments of small intestinal mucosa with no specific histologic abnormality. 3. Right colon, biopsy: - Colonic mucosa with no specific histologic abnormality. 4. Left colon, biopsy: - Colonic mucosa with no specific histologic abnormality.  ALLERGIES - NKA  INTERVAL HX feel 'ok' denies abdl pain, NVD, CP, SOB, f/c appetite increased BM 6x day, no rectal blood, has BM after eating denies confusion endorses trouble getting to sleep, does sleep through nightg, sometimes forgetful (can forget what someone said a day or two ago)  Active Problems Alcoholic cirrhosis of liver with ascites (571.2) (K70.31) Crohn's disease of colon with complication (555.1) (K50.119)      Past Medical History History of cirrhosis (V12.79) (Z87.19) History of Pre-operative cardiovascular examination (V72.81) (Z01.810) History of Pre-transplant evaluation for liver transplant (V7.83) (Z01.818)      Current Meds Dulcolax 5 MG Oral Tablet Delayed Release; TAKE 4 TABLET Other 4 tablets as directed the day before colonoscopy Polyethylene Glycol 3350 17 GM/SCOOP Oral Powder; TAKE 238 GM Once Mix with 60 ounces of Gatorade as directed before colonoscopy      Assessment History of Pre-transplant evaluation for liver transplant (.83) (Z01.818) History of cirrhosis (V12.79) (Z87.19) History of Pre-operative cardiovascular examination (V72.81) (Z01.810) Patient came in for Initial liver transplant evaluation. Pt met with members of the team: Transplant Surgeon TBD Hepatologist , Transplant Coordinator, , Dietician, Pharmacist,  and ASA. Liver Transplant Education provided via Power Point presentation, overview given of transplant evaluation process, risks/benefits of transplantation, live donor/ donor transplantation,PHS risk/DCD/HCV organ sources, Waitlist management, post transplant care/medication/clinic follow-up. Provided and reviewed educational packet. All questions answered and team contact information provided. Medications reviewed and updated. Patient reviewed and signed: Informed consent for liver transplant evaluation, HIPPA, [employer], email and HIV form. Patient was advised to inform the transplant coordinator with any changes in health status.  HCV donor acceptance consent -YES  Required testing/labs reviewed with pt. Once said testing has been completed, Transplant Coordinator will present to the selection committee.  Assessment and Plan: 38F with decompensated ETOH cirr (ascites, jaundice). No HCC.  Pt consented to liver txp evaluation. The following testing is needed:  Liver Evaluation Labs HCC screening - Obtain record and CD of last MRI (if not recent, rx MRI), done in OCT, per report no liver ca, DUE APRIL Never smoker Cardiac, Risk Factors - none, TTE, EKG, refer to cards EGD UTD 23 COLON UTD 23 Infectious Disease consult, refer to Dr. Darling MAMMO - Never done (age <40, no fam hx breast ca) last PAP OCT (reported as normal, changed IUD, bleeding) GYN - obtain records, during [facility] hospitalization, has appt with OB/GYN f/u on . SW recommendations RD recommendations Dental, [facility], not needed Donor option, none at this time.        [FreeTextEntry1] : Stable VS Mild icterus  No muscle wasting  Soft abdomen, minimal clinical ascites  No HE Minimal edema of legs

## 2024-02-17 NOTE — ASSESSMENT
[FreeTextEntry1] : Imp/Suggestions: Cirrhosis due to alcohol use disorder with episode of alcohol associated hepatitis.  Had INR as high as 2.5 when she presented to [facility]. She is doing better now and responding to diuretics.   Low salt diet, high protein diet  Importance of alcohol avoidance and risks associated with drinking including death was discussed.  She is in an alcohol rehab program but not on any medical therapy for AUD. I have discussed the role of these medications with her and family.

## 2024-02-17 NOTE — ASSESSMENT
[FreeTextEntry1] : Imp/Suggestions: Cirrhosis due to alcohol use disorder with episode of alcohol associated hepatitis.  Had INR as high as 2.5 when she presented to [facility]. She is doing better now and responding to diuretics.   Low salt diet, high protein diet  Importance of alcohol avoidance and risks associated with drinking including death was discussed.  She is in an alcohol rehab program but not on any medical therapy for AUD. I have discussed the role of these medications with her and family.

## 2024-02-17 NOTE — HISTORY OF PRESENT ILLNESS
[90: Able to carry normal activity; minor signs or symptoms of disease.] : 90: Able to carry normal activity; minor signs or symptoms of disease.  [TextBox_42] : 39 yo F with asthma, depression, schizophrenia and ? Crohn's diagnosis (per pt 12 yrs ago but never followed up), menorrhagia s/p IUD and alcohol use disorder with ETOH cirr c/b ascites s/p LVP x 2, jaundice.  No HCC.  Sober since Oct 18, 2022  LVP x 2  Oct, 2023, , on Cipro ppx  Cirrhosis recently dx'd during hospitalization at [facility] (10/17/23) when pt p/w increased abdl distention, was not able to get LVP, got infected with COVID, s/p treatment with IV; then discharged 2 weeks later.  Re-presented one week later with recurrent abdl distention, found to have ascites, s/p LVP.  2 yrs prior () pt had LVP at [facility], was told she had fatty liver.  MELD 27 (from Oct)   [ABO ? ]   BM! 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  Mild portal hypertensive gastropathy. -Mild GAVE with no active oozing. . Erythema in the second part of the duodenum compatible with duodenitis. (Biopsy).  COLON  23   BBPS 6 Normal mucosa in the terminal ileum. Colonic mucosa appeared normal except for minimal edema and congestion of descending colon. Biopsies of TI, right and left colon were done.  Mild diverticulosis of sigmoid colon with small diverticulum. (Biopsy). Medium rectal hemorrhoids.   PATH Final Diagnosis 1.  Duodenum, biopsy: -   Duodenal mucosa with preserved villous architecture. -   Negative for increased intraepithelial lymphocytes and microorganisms. 2.  Terminal ileum, biopsy: -   Scant superficial fragments of small intestinal mucosa with no specific histologic abnormality. 3.  Right colon, biopsy: -   Colonic mucosa with no specific histologic abnormality. 4.  Left colon, biopsy: -   Colonic mucosa with no specific histologic abnormality.  ALLERGIES - NKA  INTERVAL HX feel 'ok' denies abdl pain, NVD, CP, SOB, f/c appetite increased BM 6x day, no rectal blood, has BM after eating denies confusion endorses trouble getting to sleep, does sleep through nightg, sometimes forgetful (can forget what someone said a day or two ago)  [de-identified] : Reason For Visit Pre-OLT work up CURT COUCH is a 38 year old female who presents for an initial visit for liver transplant evaluation.  History of Present Illness Karnofsky Performance Scale Index: 90: Able to carry normal activity; minor signs or symptoms of disease.   37 yo F with asthma, depression, schizophrenia and ? Crohn's diagnosis (per pt 12 yrs ago but never followed up), menorrhagia s/p IUD and alcohol use disorder with ETOH cirr c/b ascites s/p LVP x 2, jaundice. No HCC.  Sober since Oct 18, 2022  LVP x 2 Oct, 2023, , on Cipro ppx  Cirrhosis recently dx'd during hospitalization at [facility] (10/17/23) when pt p/w increased abdl distention, was not able to get LVP, got infected with COVID, s/p treatment with IV; then discharged 2 weeks later. Re-presented one week later with recurrent abdl distention, found to have ascites, s/p LVP. 2 yrs prior () pt had LVP at [facility], was told she had fatty liver.  MELD 27 (from Oct) [ABO ? ] BM! 25.6  accompanied by mother and GM  MEDICATIONS lasix 20mg daily Aldactone 50 mg daily cipro 500mg daily rifaximin 550mg bid lactulose folic acid thiamine 100mg mirtazapine 15mg nightly Aricept 15mg nightly trazodone 50mg nightly albuterol prn  LABS 23 (10/27)  (136) K 4.1 SCr 0.78 ALB 2.9 TB 5.6 (5.8)  (179) AST 80 (59) ALT 31 (30) HG 9.8 (8.9)  (78) INR 2.16 from 10/27  SOCIAL Tobacco - never ETOH - used to drink bottle of wine, 2 shots of vodka daily x 3-4 yrs, sober 2022 joined [facility] last year, still in the [employer] maintenance, stopped working  single, lives with mother 2 children,  (lives with their father) Covid vax - (Pfizer) x 3 covid infection Oct, 2023 - asymptomatic  FHX Mother, reformed alcoholic and drugs, s/p OLTx  ([facility]) both Maternal/Paternal Grandparent Alcoholic,  denies any cancers  SURG C Sec x 1   STUDIES MRI in Oct at [facility] pt discovered cirr, no liver cancer  EGD 23 Small column of esophageal varix without red sign. Mild portal hypertensive gastropathy. -Mild GAVE with no active oozing.. Erythema in the second part of the duodenum compatible with duodenitis. (Biopsy).  COLON 23 BBPS 6 Normal mucosa in the terminal ileum. Colonic mucosa appeared normal except for minimal edema and congestion of descending colon. Biopsies of TI, right and left colon were done. Mild diverticulosis of sigmoid colon with small diverticulum. (Biopsy). Medium rectal hemorrhoids.  PATH Final Diagnosis 1. Duodenum, biopsy: - Duodenal mucosa with preserved villous architecture. - Negative for increased intraepithelial lymphocytes and microorganisms. 2. Terminal ileum, biopsy: - Scant superficial fragments of small intestinal mucosa with no specific histologic abnormality. 3. Right colon, biopsy: - Colonic mucosa with no specific histologic abnormality. 4. Left colon, biopsy: - Colonic mucosa with no specific histologic abnormality.  ALLERGIES - NKA  INTERVAL HX feel 'ok' denies abdl pain, NVD, CP, SOB, f/c appetite increased BM 6x day, no rectal blood, has BM after eating denies confusion endorses trouble getting to sleep, does sleep through nightg, sometimes forgetful (can forget what someone said a day or two ago)  Active Problems Alcoholic cirrhosis of liver with ascites (571.2) (K70.31) Crohn's disease of colon with complication (555.1) (K50.119)      Past Medical History History of cirrhosis (V12.79) (Z87.19) History of Pre-operative cardiovascular examination (V72.81) (Z01.810) History of Pre-transplant evaluation for liver transplant (V7.83) (Z01.818)      Current Meds Dulcolax 5 MG Oral Tablet Delayed Release; TAKE 4 TABLET Other 4 tablets as directed the day before colonoscopy Polyethylene Glycol 3350 17 GM/SCOOP Oral Powder; TAKE 238 GM Once Mix with 60 ounces of Gatorade as directed before colonoscopy      Assessment History of Pre-transplant evaluation for liver transplant (.83) (Z01.818) History of cirrhosis (V12.79) (Z87.19) History of Pre-operative cardiovascular examination (V72.81) (Z01.810) Patient came in for Initial liver transplant evaluation. Pt met with members of the team: Transplant Surgeon TBD Hepatologist , Transplant Coordinator, , Dietician, Pharmacist,  and ASA. Liver Transplant Education provided via Power Point presentation, overview given of transplant evaluation process, risks/benefits of transplantation, live donor/ donor transplantation,PHS risk/DCD/HCV organ sources, Waitlist management, post transplant care/medication/clinic follow-up. Provided and reviewed educational packet. All questions answered and team contact information provided. Medications reviewed and updated. Patient reviewed and signed: Informed consent for liver transplant evaluation, HIPPA, Healthix, email and HIV form. Patient was advised to inform the transplant coordinator with any changes in health status.  HCV donor acceptance consent -YES  Required testing/labs reviewed with pt. Once said testing has been completed, Transplant Coordinator will present to the selection committee.  Assessment and Plan: 38F with decompensated ETOH cirr (ascites, jaundice). No HCC.  Pt consented to liver txp evaluation. The following testing is needed:  Liver Evaluation Labs HCC screening - Obtain record and CD of last MRI (if not recent, rx MRI), done in OCT, per report no liver ca, DUE APRIL Never smoker Cardiac, Risk Factors - none, TTE, EKG, refer to cards EGD UTD 23 COLON UTD 23 Infectious Disease consult, refer to Dr. Darling MAMMO - Never done (age <40, no fam hx breast ca) last PAP OCT (reported as normal, changed IUD, bleeding) GYN - obtain records, during [facility] hospitalization, has appt with OB/GYN f/u on . SW recommendations RD recommendations Dental, [facility], not needed Donor option, none at this time.        [FreeTextEntry1] : Stable VS Mild icterus  No muscle wasting  Soft abdomen, minimal clinical ascites  No HE Minimal edema of legs

## 2024-02-23 ENCOUNTER — NON-APPOINTMENT (OUTPATIENT)
Age: 39
End: 2024-02-23

## 2024-02-26 ENCOUNTER — APPOINTMENT (OUTPATIENT)
Dept: HEPATOLOGY | Facility: CLINIC | Age: 39
End: 2024-02-26
Payer: MEDICAID

## 2024-02-26 VITALS
WEIGHT: 170 LBS | SYSTOLIC BLOOD PRESSURE: 108 MMHG | OXYGEN SATURATION: 100 % | RESPIRATION RATE: 17 BRPM | DIASTOLIC BLOOD PRESSURE: 70 MMHG | TEMPERATURE: 97 F | HEART RATE: 97 BPM | HEIGHT: 62 IN | BODY MASS INDEX: 31.28 KG/M2

## 2024-02-26 DIAGNOSIS — K70.31 ALCOHOLIC CIRRHOSIS OF LIVER WITH ASCITES: ICD-10-CM

## 2024-02-26 DIAGNOSIS — Z01.818 ENCOUNTER FOR OTHER PREPROCEDURAL EXAMINATION: ICD-10-CM

## 2024-02-26 DIAGNOSIS — K50.119 CROHN'S DISEASE OF LARGE INTESTINE WITH UNSPECIFIED COMPLICATIONS: ICD-10-CM

## 2024-02-26 PROCEDURE — 99214 OFFICE O/P EST MOD 30 MIN: CPT

## 2024-02-29 ENCOUNTER — OUTPATIENT (OUTPATIENT)
Dept: OUTPATIENT SERVICES | Facility: HOSPITAL | Age: 39
LOS: 1 days | End: 2024-02-29
Payer: COMMERCIAL

## 2024-02-29 ENCOUNTER — RESULT REVIEW (OUTPATIENT)
Age: 39
End: 2024-02-29

## 2024-02-29 DIAGNOSIS — Z01.818 ENCOUNTER FOR OTHER PREPROCEDURAL EXAMINATION: ICD-10-CM

## 2024-02-29 PROCEDURE — 93306 TTE W/DOPPLER COMPLETE: CPT | Mod: 26

## 2024-02-29 PROCEDURE — 93306 TTE W/DOPPLER COMPLETE: CPT

## 2024-03-02 PROBLEM — K50.119 CROHN'S DISEASE OF COLON WITH COMPLICATION: Status: ACTIVE | Noted: 2023-11-21

## 2024-03-02 NOTE — ASSESSMENT
[FreeTextEntry1] : 37 yo F ETOH cirrhosis c/b ascites s/p LVP x 2 and jaundice, No HCC.  Pt in transplant evaluation..  Multiple No shows.  Pt appears to be improving with sobriety. Sober since Oct 2022.  MELD 18 [ABO O]  HCC screening MRI - pt reported at [facility] in Oct though no record of that - will scd MRI  weight gain, 20 lbs since October rec reduce carbs, snacks, diet/exercise will put pt in touch with dietician  TRANSPLANT EVAL W/U: HCV donor acceptance consent -YES  LIVER TXP W/U Pending: No Show to ID appt 2/9 (needs rescd) TTE 2/14 did not go, rescd 29th - Cards appt with Dr. Lim 3/7 GYN appt 1/7 with pt's GYN, was cancelled as doctor was out, pt resc- PAP, needs MAMMO  MRI - pt reported at [facility] in Oct though no record of that - will scd MRI SW [facility], cont recs with Marlen  LIVER W/U complete: EGD/COLON done 12/23/23  LABS 12/20 - MELD 18 [ABO O] no immunity to Hep A, rec vax with PCP QTF indeterminate, repeat + PEth ****   - Dr. Ribera consultation 2/7 'Regular PEth monitoring Continue to monitor Cannabis use Continue to f/u with [facility] at [facility] and offer support/ counseling. Obtain collateral information from Outpatient NP- Salome at [facility] once the HIPAA release consent form is sent over'  Labs tdoay RTC 6 weeks

## 2024-03-02 NOTE — END OF VISIT
[FreeTextEntry3] : I have seen and examined patient at bedside. I agree with hx, ROS, physical examination, imp/suggestions as written by Ms. Sasha Partida NP. Please see my note.
English

## 2024-03-02 NOTE — PHYSICAL EXAM
[Alert] : alert [Clear to Auscultation] : lungs were clear to auscultation bilaterally [Breathing Comfortably on room air] : breathing comfortably on room air [No Edema] : no edema [Scleral Icterus] : no scleral icterus [Jaundice] : no jaundice [de-identified] : large, soft

## 2024-03-02 NOTE — HISTORY OF PRESENT ILLNESS
[90: Able to carry normal activity; minor signs or symptoms of disease.] : 90: Able to carry normal activity; minor signs or symptoms of disease.  [TextBox_42] : 37 yo F with asthma, depression, schizophrenia and ? Crohn's diagnosis (per pt 12 yrs ago but never followed up), menorrhagia s/p IUD and alcohol use disorder with ETOH cirr c/b ascites s/p LVP x 2, jaundice. No HCC.  Sober since Oct 18, 2022  LVP x 2 Oct, 2023, , on Cipro ppx  Cirrhosis recently dx'd during hospitalization at [facility] (10/17/23) when pt p/w increased abdl distention, was not able to get LVP, got infected with COVID, s/p treatment with IV; then discharged 2 weeks later. Re-presented one week later with recurrent abdl distention, found to have ascites, s/p LVP. 2 yrs prior () pt had LVP at [facility], was told she had fatty liver.  MELD 27 (from Oct) [ABO ? ] BM! 25.6  accompanied by mother and GM  MEDICATIONS lasix 20mg daily Aldactone 50 mg daily cipro 500mg daily rifaximin 550mg bid lactulose (stopped for a week bec has had 3BM daily) folic acid thiamine 100mg mirtazapine 15mg nightly Aricept 15mg nightly trazodone 50mg nightly albuterol prn  LABS 23 (10/27)  (136) K 4.1 SCr 0.78 ALB 2.9 TB 5.6 (5.8)  (179) AST 80 (59) ALT 31 (30) HG 9.8 (8.9)  (78) INR 2.16 from 10/27  SOCIAL Tobacco - never ETOH - used to drink bottle of wine, 2 shots of vodka daily x 3-4 yrs, sober 2022 joined [facility] last year, still in the [employer] maintenance, stopped working  single, lives with mother 2 children,  (lives with their father) Covid vax - (Pfizer) x 3 covid infection Oct, 2023 - asymptomatic  FHX Mother, reformed alcoholic and drugs, s/p OLTx  ([facility]) both Maternal/Paternal Grandparent Alcoholic,  denies any cancers  SURG C Sec x 1   STUDIES MRI in Oct at [facility] - per pt discovered cirr, no liver cancer  EGD 23 Small column of esophageal varix without red sign. Mild portal hypertensive gastropathy. -Mild GAVE with no active oozing.. Erythema in the second part of the duodenum compatible with duodenitis. (Biopsy).  COLON 23 BBPS 6 Normal mucosa in the terminal ileum. Colonic mucosa appeared normal except for minimal edema and congestion of descending colon. Biopsies of TI, right and left colon were done. Mild diverticulosis of sigmoid colon with small diverticulum. (Biopsy). Medium rectal hemorrhoids.  PATH Final Diagnosis 1. Duodenum, biopsy: - Duodenal mucosa with preserved villous architecture. - Negative for increased intraepithelial lymphocytes and microorganisms. 2. Terminal ileum, biopsy: - Scant superficial fragments of small intestinal mucosa with no specific histologic abnormality. 3. Right colon, biopsy: - Colonic mucosa with no specific histologic abnormality. 4. Left colon, biopsy: - Colonic mucosa with no specific histologic abnormality.  ALLERGIES - NKA  INTERVAL HX feeling ok, though gaining weight eating more junk food, ice cream, cookies, sweets, and sedentary denies abdl pain, NVD, CP, SOB, f/c had a head cold last week, covid tested negative appetite good BM 4-5x/day  (held lactulose) denies any alcohol drinking, last drink Oct, 2023, in RPP [place], seen weekly on  in person itching in [place] chronic - 1 yrs

## 2024-03-04 ENCOUNTER — APPOINTMENT (OUTPATIENT)
Dept: HEART AND VASCULAR | Facility: CLINIC | Age: 39
End: 2024-03-04

## 2024-03-07 ENCOUNTER — APPOINTMENT (OUTPATIENT)
Dept: HEART AND VASCULAR | Facility: CLINIC | Age: 39
End: 2024-03-07

## 2024-03-21 NOTE — REASON FOR VISIT
[Patient preference] : as per patient preference [Telehealth (audio & video) - Individual/Group] : This visit was provided via telehealth using real-time 2-way audio visual technology. [Medical Office: ([facility])___] : The provider was located at the medical office in [unfilled]. [Home] : The patient, [unfilled], was located at home, [unfilled], at the time of the visit. [Verbal consent obtained from patient/other participant(s)] : Verbal consent for telehealth/telephonic services obtained from patient/other participant(s) [[facility] Provider/Facility] : [facility] Provider/Facility [Patient] : Patient [FreeTextEntry4] : 11 am  [FreeTextEntry5] : 12 pm  [FreeTextEntry2] : Pre-operative clearance for liver transplant  [FreeTextEntry1] : "Alcohol use lead to liver issues"

## 2024-03-21 NOTE — DISCUSSION/SUMMARY
[Low acute suicide risk] : Low acute suicide risk [No] : No [Not clinically indicated] : Safety Plan completed/updated (for individuals at risk): Not clinically indicated [FreeTextEntry1] : Patient is a 39 YO  female, unemployed, domiciled  with PPH of Depression, Schizophrenia and PMH of Alcoholic cirrhosis of liver with ascites, Asthma referred to the clinic for pre operative  clearance for Liver Transplant  Patient evaluated via Telehealth. She was calm, cooperative with no acute overt depressive/ manic or anxiety symptoms elicited. Denied AH, VH, paranoia. Endorsed some passive SI but no active SI/HI/I/P. There is a past h/o of inpatient psychiatric hospitalization but no h/o suicide attempts. No acute safety concerns elicited on this evaluation. She has extensive h/o alcohol use with last use in October 2023. She is at high risk for relapse. She is currently enrolled in RPP at  [facility] in the [place] ([phone]) and was advised to continue to follow up with them. She seemed to have a good understanding of the indications, risks vs benefits of the process of Liver Transplant and is looking forward to the procedure. SIPAT score: 34 (Minimally acceptable candidate)  DX: Alcohol use disorder, severe Pre-transplant evaluation for liver transplant Depressive disorder by history Schizophrenia by history   Plan: Regular PEth monitoring Continue to monitor Cannabis use Continue to f/u with RPP at [facility] and offer support/ counseling.  Obtain collateral information from Outpatient NP- Salome at [facility] once the HIPAA release consent form is sent over  Continue with current medications  Counselled to call 911 or go to ER for any acute behavioral emergencies including SI/HI

## 2024-03-21 NOTE — PHYSICAL EXAM
[Well groomed] : well groomed [Cooperative] : cooperative [Euthymic] : euthymic [Full] : full [Clear] : clear [Linear/Goal Directed] : linear/goal directed [None] : none [None Reported] : none reported [Average] : average [WNL] : within normal limits [FreeTextEntry2] : Unable to assess [FreeTextEntry3] : Unable to assess

## 2024-03-21 NOTE — HISTORY OF PRESENT ILLNESS
[FreeTextEntry1] : Patient is a 39 YO  female, unemployed, domiciled  with PPH of Depression, Schizophrenia and PMH of Alcoholic cirrhosis of liver with ascites, Asthma referred to the clinic for pre operative  clearance for Liver Transplant  Patient with extensive h/o ETOH use.  Patient reports her last drink was prior to a hospitalization at [facility] mid October. Pt reports she went into the hospital for stomach pain and at that time was diagnosed with cirrhosis. Pt notes first being aware of her liver issues in 2022 when her PCP warned her about her liver issues and asked her to f/u with Liver specialist but she didn't take the warning seriously. Pt stated prior to her hospitalization in October last year, she was drinking a small bottle of wine + 2 vodka shots daily for the last 2 years. Pt reports prior to this, she was a social drinker, usually 2 days per week. After her father passed away in 2022, she started drinking on a daily basis. Pt reports attempting to stop drinking in the past, at most was able to stop 2-3 months. Pt reports history of AA, and 1 attempt at inpatient rehab in [place] in 2022.  After the rehab in 2022, pt was enrolled in outpatient treatment at [facility] in the [place] ([phone]) and reports she is still currently enrolled.  She attends it 5 days a week via Zoom and then goes in person once a week.  Since starting, pt has been engaged with therapists Ms Theodore and Mr César , and NAZANIN Mcmahon who prescribes medications for depression and schizophrenia. Pt reports being happy with program and wishes to stay engaged. She also stated that she smokes Cannabis once a month when she hangs out with one particular friend. Advised to quit Cannabis and maintain sobriety from all substances.  Pt expressed understanding that her liver disease is from ETOH use. Stated that she met with the transplant team and is aware of the benefits vs risks including bleeding, sepsis, death, graft rejection. She is aware that she will be placed on immunosuppressants and steroids post operatively. Identified her mother as her social support who will take care of her post operatively. Pt verbalized understanding that she must maintain sobriety, demonstrate compliancy with [facility], and will be tested for alcohol use by our transplant center. She identified mother Sharri as primary caregiver. Also stated that her sister and grandmother will be involved in her care as well. Mother does not work, will be able to assist with all care at home. Pt notes that she first had a depressive episode in 2020, but didn't follow up with a Psychiatrist. Then in 2022, around the time when she was in an Inpatient Rehab she got diagnosed with Schizophrenia. Reported that she endorsed VH and AH making derogatory comments. Unable to confirm whether it was in context of alcohol use disorder/ withdrawal symptoms but stated that in 2023, she had her first psychiatric hospitalization at [place] for AH, VH, paranoia. She was admitted for 1.5 weeks. She is currently on Trazodone 50 mg QHS, Remeron 30 mg QHS and Abilify 15 mg QHS. Reports being compliant with medications.  Denied current AH, VH, paranoia. Also denied acute depressive/manic or anxiety symptoms. No issues with sleep or appetite. However did endorse passive SI once every 2 weeks when she feels that she doesn't want to live but it never escalated to the point of having active SI/HI/I/P. Also denied past SA/SIB. Identified her family and therapist as her support system. Pt was born in the [place], she is single, never , has a 11 YO daughter, 15 YO son from previous relationship. She reports that she lives in an apartment with her mother, younger sister and 2 kids. The father of the kids is involved as well. Pt reports being independent with ADLs, and ambulation, but does not drive. Pt last worked June 2023, used to work in the [employer] department seasonally for 3 months, but previously was working as a [employer] for 3 years.  Pt reports receiving food stamps at this time and is looking for housing so that she can move in with her children. No h/o legal issues, no DUI/DWI. Denied access to weapons. Denied FH of psychiatric illness. Reported that her mother also underwent Liver transplant and doing well.     [FreeTextEntry2] : See HPI [FreeTextEntry3] : She is currently on Trazodone 50 mg QHS, Remeron 30 mg QHS and Abilify 15 mg QHS.

## 2024-03-21 NOTE — ADDENDUM
[FreeTextEntry1] : Patient seen with fellow, agree with above assessment and plan; SIPAT 34, deemed to be minimally acceptable candidate with identified risk factors of past h/o of inpatient psychiatric hospitalization but no h/o suicide attempts. No acute safety concerns elicited on this evaluation. She has extensive h/o alcohol use with last use in October 2023. Would continue outpatient psychiatric care for history of Schizophrenia. Will attempt to gauge compliance at [place].

## 2024-04-03 ENCOUNTER — APPOINTMENT (OUTPATIENT)
Dept: INFECTIOUS DISEASE | Facility: CLINIC | Age: 39
End: 2024-04-03

## 2024-04-05 ENCOUNTER — NON-APPOINTMENT (OUTPATIENT)
Age: 39
End: 2024-04-05

## 2024-04-05 ENCOUNTER — APPOINTMENT (OUTPATIENT)
Dept: INFECTIOUS DISEASE | Facility: CLINIC | Age: 39
End: 2024-04-05
Payer: COMMERCIAL

## 2024-04-05 VITALS
HEART RATE: 97 BPM | HEIGHT: 62 IN | BODY MASS INDEX: 29.63 KG/M2 | SYSTOLIC BLOOD PRESSURE: 120 MMHG | TEMPERATURE: 98.6 F | WEIGHT: 161 LBS | DIASTOLIC BLOOD PRESSURE: 77 MMHG | OXYGEN SATURATION: 99 %

## 2024-04-05 PROCEDURE — 99204 OFFICE O/P NEW MOD 45 MIN: CPT

## 2024-04-05 NOTE — PHYSICAL EXAM
[General Appearance - Alert] : alert [General Appearance - In No Acute Distress] : in no acute distress [Sclera] : the sclera and conjunctiva were normal [Extraocular Movements] : extraocular movements were intact [Neck Appearance] : the appearance of the neck was normal [Auscultation Breath Sounds / Voice Sounds] : lungs were clear to auscultation bilaterally [Heart Rate And Rhythm] : heart rate was normal and rhythm regular [Heart Sounds] : normal S1 and S2 [Abdomen Soft] : soft [Abdomen Tenderness] : non-tender [No Palpable Adenopathy] : no palpable adenopathy [Skin Color & Pigmentation] : normal skin color and pigmentation [] : no rash [Oriented To Time, Place, And Person] : oriented to person, place, and time [Affect] : the affect was normal

## 2024-04-05 NOTE — ASSESSMENT
[FreeTextEntry1] : #Infectious serologies  CMV IgG positive  EBV IgG positive  HAV total Ab negative HBV - sAg neg, sAb pos 33, cAb neg  HCV Ab neg  HIV 4th gen Ag/Ab neg  TPAB neg  MMR IgG (measles IgG, mumps IgG, rubella IgG) all positive  VZV IgG positive  Quantiferon indeterminate. Will repeat today Strongy ab negative   #Immunizations  -- Pneumococcus: should receive PCV20 if has not received pneumonia vaccines previously. Otherwise should receive vaccination per flow chart on UpToDate  -- Flu shot: annually (high dose preferred post-transplant)  -- TDAP: got 9/2023  -- Hep A: give HAVRIX or VAQTA  -- Shingles: should receive shingrix x2 doses if has not received this previously, with second dose to be given 2-6 months after first dose  -- COVID19: should have at least 3 doses, with one of which being a bivalent dose   # Exposures - if receives transplant, she should ideally find alternative employment from prior [employer] work. Also should avoid cleaning cat's litter box, and avoid cat scratches/bites.  # SBP prophylaxis with cipro Unclear if this is based on prior episode of SBP, or only on fluid analysis from prior para (para results not currently seen in chart, and patient denies hx of SBP). She is tolerating cipro 500mg daily thusfar.  Pending completion of the above, there are no infectious contraindications to proceeding with organ transplant.

## 2024-04-08 ENCOUNTER — APPOINTMENT (OUTPATIENT)
Dept: HEPATOLOGY | Facility: CLINIC | Age: 39
End: 2024-04-08

## 2024-04-08 ENCOUNTER — APPOINTMENT (OUTPATIENT)
Age: 39
End: 2024-04-08

## 2024-04-08 NOTE — HISTORY OF PRESENT ILLNESS
[TextBox_42] : 39 yo F with asthma, depression, schizophrenia and ? Crohn's diagnosis (per pt 12 yrs ago but never followed up), menorrhagia s/p IUD and alcohol use disorder with ETOH cirr c/b ascites s/p LVP x 2, jaundice. No HCC.  Sober since Oct 18, 2022  LVP x 2 Oct, 2023, , on Cipro ppx  Cirrhosis recently dx'd during hospitalization at [facility] (10/17/23) when pt p/w increased abdl distention, was not able to get LVP, got infected with COVID, s/p treatment with IV; then discharged 2 weeks later. Re-presented one week later with recurrent abdl distention, found to have ascites, s/p LVP. 2 yrs prior () pt had LVP at [facility], was told she had fatty liver.  MELD 27 (from Oct) [ABO ? ] BM! 25.6  accompanied by mother and GM  MEDICATIONS lasix 20mg daily Aldactone 50 mg daily cipro 500mg daily rifaximin 550mg bid lactulose (stopped for a week bec has had 3BM daily) folic acid thiamine 100mg mirtazapine 15mg nightly Aricept 15mg nightly trazodone 50mg nightly albuterol prn  LABS 24 MELD 18 [ABO O] INR 1.51 AST 84 ALT 37  ALB 3.3 SCr 0.78 Na 135 K 3.6 ALB 3.3  no immunity to Hep A, rec vax with PCP has immunity to HepB QTF indetermine, repeat *elev LDL, f/u with PCP + PEth 195  SOCIAL Tobacco - never ETOH - used to drink bottle of wine, 2 shots of vodka daily x 3-4 yrs, sober 2022 joined [facility] last year, still in the [employer] maintenance, stopped working  single, lives with mother 2 children,  (lives with their father) Covid vax - (Pfizer) x 3 covid infection Oct, 2023 - asymptomatic  FHX Mother, reformed alcoholic and drugs, s/p OLTx  ([facility]) both Maternal/Paternal Grandparent Alcoholic,  denies any cancers  SURG C Sec x 1   STUDIES MRI in Oct at [facility] - per pt discovered cirr, no liver cancer  EGD 23 Small column of esophageal varix without red sign. Mild portal hypertensive gastropathy. -Mild GAVE with no active oozing.. Erythema in the second part of the duodenum compatible with duodenitis. (Biopsy).  COLON 23 BBPS 6 Normal mucosa in the terminal ileum. Colonic mucosa appeared normal except for minimal edema and congestion of descending colon. Biopsies of TI, right and left colon were done. Mild diverticulosis of sigmoid colon with small diverticulum. (Biopsy). Medium rectal hemorrhoids.  PATH Final Diagnosis 1. Duodenum, biopsy: - Duodenal mucosa with preserved villous architecture. - Negative for increased intraepithelial lymphocytes and microorganisms. 2. Terminal ileum, biopsy: - Scant superficial fragments of small intestinal mucosa with no specific histologic abnormality. 3. Right colon, biopsy: - Colonic mucosa with no specific histologic abnormality. 4. Left colon, biopsy: - Colonic mucosa with no specific histologic abnormality.  ALLERGIES - NKA  INTERVAL HX

## 2024-04-08 NOTE — ASSESSMENT
[FreeTextEntry1] : 39 yo F ETOH cirrhosis c/b ascites s/p LVP x 2 and jaundice, No HCC. Pt in transplant evaluation.. Multiple No shows. Pt appears to be improving with sobriety. Sober since Oct 2022.  MELD 18 [ABO O]  HCC screening MRI - pt reported at [facility] in Oct though no record of that - will scd MRI  weight gain, 20 lbs since October rec reduce carbs, snacks, diet/exercise will put pt in touch with dietician  TRANSPLANT EVAL W/U: HCV donor acceptance consent -YES LIVER TXP W/U Pending: TTE done 2/29- Cards appt rescd, Dr. Brandon 5/6 GYN appt 1/7 with pt's GYN, did she go?,obtain records - PAP, needs MAMMO MRI - pt reported at [facility] in Oct though no record of that - MRI scd 4/8 CALEB, Anastasiia Monge, RPP, + PEth 195 12/20/23. , seen by Dr. Ribera, rec reg PEth monitoring, cont YASIR at [facility] 'SIPAT 34, deemed to be minimally acceptable candidate with identified risk factors of past h/o of inpatient psychiatric hospitalization but no h/o suicide attempts. No acute safety concerns elicited on this evaluation. She has extensive h/o alcohol use with last use in October 2023. Would continue outpatient psychiatric care for history of Schizophrenia. Will attempt to gauge compliance at [facility]."  LIVER W/U complete: EGD/COLON UTD 12/23/23 ID 4/5, Dr. Darling - QTF indeterminate, repeat, no immunity to Hep A, rec vax with PCP.  LABS 12/20 - MELD 18 [ABO O] no immunity to Hep A, rec vax with PCP QTF indeterminate, repeat + PEth ****   - Dr. Ribera consultation 2/7 'Regular PEth monitoring Continue to monitor Cannabis use Continue to f/u with RPP at [facility] and offer support/ counseling. Obtain collateral information from Outpatient NP- Salome at [facility] once the HIPAA release consent form is sent over'  Labs today RTC 6 weeks.

## 2024-04-09 LAB
M TB IFN-G BLD-IMP: NEGATIVE
QUANTIFERON TB PLUS MITOGEN MINUS NIL: 1.24 IU/ML
QUANTIFERON TB PLUS NIL: 0.03 IU/ML
QUANTIFERON TB PLUS TB1 MINUS NIL: 0 IU/ML
QUANTIFERON TB PLUS TB2 MINUS NIL: 0 IU/ML

## 2024-05-06 ENCOUNTER — APPOINTMENT (OUTPATIENT)
Dept: HEART AND VASCULAR | Facility: CLINIC | Age: 39
End: 2024-05-06

## 2024-05-21 NOTE — ED ADULT NURSE NOTE - NSFALLRISKASMTTYPE_ED_ALL_ED
Patient also needs an OP limited echo.  Please assist patient with getting this scheduled.  Thanks    Initial (On Arrival)

## 2024-06-13 ENCOUNTER — APPOINTMENT (OUTPATIENT)
Dept: HEART AND VASCULAR | Facility: CLINIC | Age: 39
End: 2024-06-13

## 2024-07-11 ENCOUNTER — APPOINTMENT (OUTPATIENT)
Dept: HEPATOLOGY | Facility: CLINIC | Age: 39
End: 2024-07-11

## 2024-09-13 ENCOUNTER — NON-APPOINTMENT (OUTPATIENT)
Age: 39
End: 2024-09-13
